# Patient Record
Sex: MALE | Race: WHITE | NOT HISPANIC OR LATINO | Employment: OTHER | URBAN - METROPOLITAN AREA
[De-identification: names, ages, dates, MRNs, and addresses within clinical notes are randomized per-mention and may not be internally consistent; named-entity substitution may affect disease eponyms.]

---

## 2020-09-17 DIAGNOSIS — Z12.11 SCREENING FOR COLON CANCER: Primary | ICD-10-CM

## 2020-09-20 ENCOUNTER — APPOINTMENT (OUTPATIENT)
Dept: PREADMISSION TESTING | Facility: HOSPITAL | Age: 80
End: 2020-09-20

## 2020-09-20 PROCEDURE — U0004 COV-19 TEST NON-CDC HGH THRU: HCPCS

## 2020-09-20 PROCEDURE — C9803 HOPD COVID-19 SPEC COLLECT: HCPCS

## 2020-09-21 LAB — SARS-COV-2 RNA NOSE QL NAA+PROBE: NOT DETECTED

## 2020-09-23 ENCOUNTER — OUTSIDE FACILITY SERVICE (OUTPATIENT)
Dept: GASTROENTEROLOGY | Facility: CLINIC | Age: 80
End: 2020-09-23

## 2020-09-23 PROCEDURE — G0105 COLORECTAL SCRN; HI RISK IND: HCPCS | Performed by: INTERNAL MEDICINE

## 2022-10-23 ENCOUNTER — APPOINTMENT (OUTPATIENT)
Dept: GENERAL RADIOLOGY | Facility: HOSPITAL | Age: 82
End: 2022-10-23

## 2022-10-23 ENCOUNTER — HOSPITAL ENCOUNTER (EMERGENCY)
Facility: HOSPITAL | Age: 82
Discharge: HOME | End: 2022-10-23
Attending: EMERGENCY MEDICINE | Admitting: EMERGENCY MEDICINE

## 2022-10-23 VITALS
OXYGEN SATURATION: 94 % | HEIGHT: 69 IN | TEMPERATURE: 98.1 F | DIASTOLIC BLOOD PRESSURE: 83 MMHG | SYSTOLIC BLOOD PRESSURE: 151 MMHG | WEIGHT: 185 LBS | RESPIRATION RATE: 18 BRPM | BODY MASS INDEX: 27.4 KG/M2 | HEART RATE: 101 BPM

## 2022-10-23 DIAGNOSIS — R06.00 DYSPNEA, UNSPECIFIED TYPE: ICD-10-CM

## 2022-10-23 DIAGNOSIS — J81.0 ACUTE PULMONARY EDEMA: Primary | ICD-10-CM

## 2022-10-23 DIAGNOSIS — J39.8 CONGESTION OF UPPER RESPIRATORY TRACT: ICD-10-CM

## 2022-10-23 LAB
ALBUMIN SERPL-MCNC: 3.6 G/DL (ref 3.5–5.2)
ALBUMIN/GLOB SERPL: 1.2 G/DL
ALP SERPL-CCNC: 66 U/L (ref 39–117)
ALT SERPL W P-5'-P-CCNC: 14 U/L (ref 1–41)
ANION GAP SERPL CALCULATED.3IONS-SCNC: 8 MMOL/L (ref 5–15)
AST SERPL-CCNC: 18 U/L (ref 1–40)
BASOPHILS # BLD AUTO: 0.03 10*3/MM3 (ref 0–0.2)
BASOPHILS NFR BLD AUTO: 0.3 % (ref 0–1.5)
BILIRUB SERPL-MCNC: 0.6 MG/DL (ref 0–1.2)
BUN SERPL-MCNC: 13 MG/DL (ref 8–23)
BUN/CREAT SERPL: 13 (ref 7–25)
CALCIUM SPEC-SCNC: 9.2 MG/DL (ref 8.6–10.5)
CHLORIDE SERPL-SCNC: 102 MMOL/L (ref 98–107)
CO2 SERPL-SCNC: 27 MMOL/L (ref 22–29)
CREAT SERPL-MCNC: 1 MG/DL (ref 0.76–1.27)
DEPRECATED RDW RBC AUTO: 42.5 FL (ref 37–54)
EGFRCR SERPLBLD CKD-EPI 2021: 75.1 ML/MIN/1.73
EOSINOPHIL # BLD AUTO: 0.03 10*3/MM3 (ref 0–0.4)
EOSINOPHIL NFR BLD AUTO: 0.3 % (ref 0.3–6.2)
ERYTHROCYTE [DISTWIDTH] IN BLOOD BY AUTOMATED COUNT: 12.5 % (ref 12.3–15.4)
FLUAV SUBTYP SPEC NAA+PROBE: NOT DETECTED
FLUBV RNA ISLT QL NAA+PROBE: NOT DETECTED
GLOBULIN UR ELPH-MCNC: 3 GM/DL
GLUCOSE SERPL-MCNC: 121 MG/DL (ref 65–99)
HCT VFR BLD AUTO: 42.7 % (ref 37.5–51)
HGB BLD-MCNC: 14.4 G/DL (ref 13–17.7)
IMM GRANULOCYTES # BLD AUTO: 0.02 10*3/MM3 (ref 0–0.05)
IMM GRANULOCYTES NFR BLD AUTO: 0.2 % (ref 0–0.5)
LYMPHOCYTES # BLD AUTO: 0.71 10*3/MM3 (ref 0.7–3.1)
LYMPHOCYTES NFR BLD AUTO: 7.2 % (ref 19.6–45.3)
MCH RBC QN AUTO: 31.2 PG (ref 26.6–33)
MCHC RBC AUTO-ENTMCNC: 33.7 G/DL (ref 31.5–35.7)
MCV RBC AUTO: 92.4 FL (ref 79–97)
MONOCYTES # BLD AUTO: 0.94 10*3/MM3 (ref 0.1–0.9)
MONOCYTES NFR BLD AUTO: 9.6 % (ref 5–12)
NEUTROPHILS NFR BLD AUTO: 8.11 10*3/MM3 (ref 1.7–7)
NEUTROPHILS NFR BLD AUTO: 82.4 % (ref 42.7–76)
NRBC BLD AUTO-RTO: 0 /100 WBC (ref 0–0.2)
NT-PROBNP SERPL-MCNC: 272 PG/ML (ref 0–1800)
PLATELET # BLD AUTO: 148 10*3/MM3 (ref 140–450)
PMV BLD AUTO: 9.8 FL (ref 6–12)
POTASSIUM SERPL-SCNC: 4.2 MMOL/L (ref 3.5–5.2)
PROT SERPL-MCNC: 6.6 G/DL (ref 6–8.5)
RBC # BLD AUTO: 4.62 10*6/MM3 (ref 4.14–5.8)
SARS-COV-2 RNA PNL SPEC NAA+PROBE: NOT DETECTED
SODIUM SERPL-SCNC: 137 MMOL/L (ref 136–145)
TROPONIN T SERPL-MCNC: <0.01 NG/ML (ref 0–0.03)
WBC NRBC COR # BLD: 9.84 10*3/MM3 (ref 3.4–10.8)

## 2022-10-23 PROCEDURE — 85025 COMPLETE CBC W/AUTO DIFF WBC: CPT | Performed by: EMERGENCY MEDICINE

## 2022-10-23 PROCEDURE — 93005 ELECTROCARDIOGRAM TRACING: CPT | Performed by: EMERGENCY MEDICINE

## 2022-10-23 PROCEDURE — 83880 ASSAY OF NATRIURETIC PEPTIDE: CPT | Performed by: EMERGENCY MEDICINE

## 2022-10-23 PROCEDURE — 71045 X-RAY EXAM CHEST 1 VIEW: CPT

## 2022-10-23 PROCEDURE — 87636 SARSCOV2 & INF A&B AMP PRB: CPT | Performed by: EMERGENCY MEDICINE

## 2022-10-23 PROCEDURE — 84484 ASSAY OF TROPONIN QUANT: CPT | Performed by: EMERGENCY MEDICINE

## 2022-10-23 PROCEDURE — 99284 EMERGENCY DEPT VISIT MOD MDM: CPT

## 2022-10-23 PROCEDURE — 80053 COMPREHEN METABOLIC PANEL: CPT | Performed by: EMERGENCY MEDICINE

## 2022-10-23 PROCEDURE — 99283 EMERGENCY DEPT VISIT LOW MDM: CPT

## 2022-10-23 PROCEDURE — 36415 COLL VENOUS BLD VENIPUNCTURE: CPT

## 2022-10-23 RX ORDER — FUROSEMIDE 20 MG/1
20 TABLET ORAL ONCE
Status: COMPLETED | OUTPATIENT
Start: 2022-10-23 | End: 2022-10-23

## 2022-10-23 RX ORDER — FUROSEMIDE 20 MG/1
20 TABLET ORAL DAILY
Qty: 5 TABLET | Refills: 0 | Status: SHIPPED | OUTPATIENT
Start: 2022-10-23 | End: 2022-10-25 | Stop reason: SDUPTHER

## 2022-10-23 RX ADMIN — FUROSEMIDE 20 MG: 20 TABLET ORAL at 14:36

## 2022-10-23 NOTE — ED PROVIDER NOTES
Subjective   History of Present Illness  82-year-old male who presents for evaluation of cough, upper respiratory congestion, and mild shortness of breath.  He reports that he began developing some cough within the last 3 days.  He has had upper respiratory congestion and postnasal drainage.  He feels like he has mild chest pain secondary to cough but otherwise denies any chest pain.  He also reports feeling very mildly short of breath.  He denies underlying history of lung disease and does not typically wear oxygen.  He is standing and ambulated into the ER on his own and appears well nontoxic and in no acute distress.  He also complains of some increased urinary frequency and mild dysuria over the last couple days.  He denies previous known history of kidney stones or urinary tract infections.  No reported abdominal pain or flank pain.  He denies take medication prior to arrival.  He denies any known sick contacts.  He has not received testing for COVID or influenza prior to arrival.  No other acute complaints.        Review of Systems   Constitutional: Positive for fatigue. Negative for chills and fever.   HENT: Positive for congestion, postnasal drip and rhinorrhea. Negative for ear pain, sinus pressure and sore throat.    Eyes: Negative for pain, redness and visual disturbance.   Respiratory: Positive for cough and shortness of breath. Negative for chest tightness.    Cardiovascular: Positive for chest pain. Negative for palpitations and leg swelling.   Gastrointestinal: Negative for abdominal pain, anal bleeding, blood in stool, diarrhea, nausea and vomiting.   Endocrine: Negative for polydipsia and polyuria.   Genitourinary: Negative for difficulty urinating, dysuria, frequency and urgency.   Musculoskeletal: Negative for arthralgias, back pain and neck pain.   Skin: Negative for pallor and rash.   Allergic/Immunologic: Negative for environmental allergies and immunocompromised state.   Neurological: Negative  for dizziness, weakness and headaches.   Hematological: Negative for adenopathy.   Psychiatric/Behavioral: Negative for confusion, self-injury and suicidal ideas. The patient is not nervous/anxious.    All other systems reviewed and are negative.      Past Medical History:   Diagnosis Date   • Allergies    • Benign prostate hyperplasia    • Heart burn        No Known Allergies    History reviewed. No pertinent surgical history.    History reviewed. No pertinent family history.    Social History     Socioeconomic History   • Marital status:            Objective   Physical Exam  Vitals and nursing note reviewed.   Constitutional:       General: He is not in acute distress.     Appearance: Normal appearance. He is well-developed. He is not toxic-appearing or diaphoretic.      Comments: Warm to touch   HENT:      Head: Normocephalic and atraumatic.      Right Ear: External ear normal.      Left Ear: External ear normal.      Nose: Nose normal.   Eyes:      General: Lids are normal.      Pupils: Pupils are equal, round, and reactive to light.   Neck:      Trachea: No tracheal deviation.   Cardiovascular:      Rate and Rhythm: Regular rhythm. Tachycardia present.      Pulses: No decreased pulses.      Heart sounds: Normal heart sounds. No murmur heard.    No friction rub. No gallop.   Pulmonary:      Effort: Pulmonary effort is normal. No respiratory distress.      Breath sounds: Normal breath sounds. No decreased breath sounds, wheezing, rhonchi or rales.   Abdominal:      General: Bowel sounds are normal.      Palpations: Abdomen is soft.      Tenderness: There is no abdominal tenderness. There is no guarding or rebound.   Musculoskeletal:         General: No deformity. Normal range of motion.      Cervical back: Normal range of motion and neck supple.   Lymphadenopathy:      Cervical: No cervical adenopathy.   Skin:     General: Skin is warm and dry.      Findings: No rash.   Neurological:      Mental Status: He  is alert and oriented to person, place, and time.      Cranial Nerves: No cranial nerve deficit.      Sensory: No sensory deficit.   Psychiatric:         Speech: Speech normal.         Behavior: Behavior normal.         Thought Content: Thought content normal.         Judgment: Judgment normal.         Procedures           ED Course                                           MDM  Number of Diagnoses or Management Options  Acute pulmonary edema (HCC): new and requires workup  Congestion of upper respiratory tract: new and requires workup  Dyspnea, unspecified type: new and requires workup  Diagnosis management comments: The patient has signs of pulmonary edema on chest x-ray.  Lungs are relatively clear to auscultation oxygen saturations have remained within normal range throughout the ER course.    COVID and influenza test are negative.  Lab evaluation shows normal BMP with no other significant abnormalities including normal troponin.  EKG does not show any ischemic changes.    He denies any chest pain on repeat evaluation.    He will be discharged with a short course of Lasix and will be referred to the heart valve clinic for further outpatient evaluation.    Discharged appearing well with the advised to return to the ER with any further concern.       Amount and/or Complexity of Data Reviewed  Clinical lab tests: ordered and reviewed  Tests in the radiology section of CPT®: ordered and reviewed  Obtain history from someone other than the patient: yes  Review and summarize past medical records: yes  Independent visualization of images, tracings, or specimens: yes        Final diagnoses:   Acute pulmonary edema (HCC)   Dyspnea, unspecified type   Congestion of upper respiratory tract       ED Disposition  ED Disposition     ED Disposition   Discharge    Condition   Stable    Comment   --             VONDA BURLESON [place]  [address]  [facility], located within [facility] [id number] [phone]        [place]  Demetrius MARTINS MD  [address] [facility] [address]  [phone]    In 1 week           Medication List      New Prescriptions    furosemide 20 MG tablet  Commonly known as: LASIX  Take 1 tablet by mouth Daily for 5 days.           Where to Get Your Medications      These medications were sent to [facility] - [place], [place] - [address] [place] [address] - [phone]  - [phone]   [address] [facility] [address]    Phone: [phone]   · furosemide 20 MG tablet          Nae Castro MD  10/23/22 2356     No

## 2022-10-23 NOTE — DISCHARGE INSTRUCTIONS
Patient advised to take Lasix as prescribed on a daily basis.    Follow-up in heart valve clinic for outpatient evaluation.    Return to the ER with any further concern.

## 2022-10-25 ENCOUNTER — OFFICE VISIT (OUTPATIENT)
Dept: CARDIOLOGY | Facility: HOSPITAL | Age: 82
End: 2022-10-25

## 2022-10-25 VITALS
RESPIRATION RATE: 16 BRPM | HEIGHT: 69 IN | OXYGEN SATURATION: 93 % | BODY MASS INDEX: 28.44 KG/M2 | SYSTOLIC BLOOD PRESSURE: 122 MMHG | TEMPERATURE: 99.2 F | DIASTOLIC BLOOD PRESSURE: 71 MMHG | WEIGHT: 192 LBS | HEART RATE: 103 BPM

## 2022-10-25 DIAGNOSIS — R60.0 BILATERAL LOWER EXTREMITY EDEMA: ICD-10-CM

## 2022-10-25 DIAGNOSIS — J81.0 ACUTE PULMONARY EDEMA: Primary | ICD-10-CM

## 2022-10-25 DIAGNOSIS — R93.89 NODULAR RADIOLOGIC DENSITY: ICD-10-CM

## 2022-10-25 LAB
QT INTERVAL: 340 MS
QTC INTERVAL: 425 MS

## 2022-10-25 PROCEDURE — 99204 OFFICE O/P NEW MOD 45 MIN: CPT | Performed by: NURSE PRACTITIONER

## 2022-10-25 RX ORDER — POTASSIUM CHLORIDE 20 MEQ/1
20 TABLET, EXTENDED RELEASE ORAL DAILY
Qty: 30 TABLET | Refills: 0 | Status: SHIPPED | OUTPATIENT
Start: 2022-10-25 | End: 2022-11-10 | Stop reason: SDUPTHER

## 2022-10-25 RX ORDER — OMEPRAZOLE 20 MG/1
20 CAPSULE, DELAYED RELEASE ORAL DAILY
COMMUNITY

## 2022-10-25 RX ORDER — FINASTERIDE 5 MG/1
TABLET, FILM COATED ORAL
COMMUNITY
Start: 2022-08-29

## 2022-10-25 RX ORDER — FUROSEMIDE 40 MG/1
40 TABLET ORAL DAILY
Qty: 30 TABLET | Refills: 0 | Status: SHIPPED | OUTPATIENT
Start: 2022-10-25 | End: 2022-11-10 | Stop reason: SDUPTHER

## 2022-10-25 RX ORDER — MONTELUKAST SODIUM 10 MG/1
10 TABLET ORAL NIGHTLY
COMMUNITY
Start: 2022-10-18 | End: 2022-11-18 | Stop reason: SDDI

## 2022-10-25 NOTE — PATIENT INSTRUCTIONS
-pulmonary will call to schedule an appointment    - Office will call to schedule testing  - Office will call or send message with testing results    [email or web address]

## 2022-10-25 NOTE — PROGRESS NOTES
Chief Complaint  Shortness of Breath, Cough, and Edema    Subjective      History of Present Illness {CC  Problem List  Visit  Diagnosis   Encounters  Notes  Medications  Labs  Result Review Imaging  Media :23}     Raul Soto, 82 y.o. male with BPH, heartburn presents to [facility] for Shortness of Breath, Cough, and Edema.    Patient was recently evaluated at [facility] emergency department for complaints of cough, upper respiratory congestion, and mild shortness of breath.  Emergency department work-up revealed normal troponin/BNP, CXR with cardiomegaly with pulmonary vascular congestion, ECG with NSR/no acute abnormality, respiratory panel negative for influenza/COVID-19. Patient was discharged with a short course of furosemide.  Patient was instructed to follow-up at [facility] heart and valve clinic for further evaluation.    Patient presents today noting continued congestion, cough, lower extremity edema since emergency department evaluation.  He reports not much improvement in his URI symptoms since emergency department evaluation. He has been taking furosemide with no noted increased diuretic response.  He notes dyspnea with longer walks; difficulty lying flat but more related to post-nasal drip/congestion.  Previously before his recent illness he was doing well with no chest pain, dyspnea with activity. He reports lower extremity edema has been ongoing for 3-4 years, but edema has worsened in the past couple of weeks.  He denies chest/anginal pain, palpitations/tachypalpitation, near-syncope/syncope. He reports a previous cardiac work-up including stress test and echocardiogram approximately 15 years ago that were reported as normal.  Reports no previous history of cardiac disease.  Does not routinely monitor his weight at home.      Objective     Vital Signs:   Vitals:    10/25/22 1125 10/25/22 1132 10/25/22 1133   BP: 121/65 125/71 122/71   BP  "Location: Right arm Left arm Left arm   Patient Position: Sitting Sitting Standing   Cuff Size: Adult Adult Adult   Pulse: 102 100 103   Resp: 16     Temp: 99.2 °F (37.3 °C) 99.2 °F (37.3 °C)    TempSrc: Temporal Temporal    SpO2: 93%     Weight: 87.1 kg (192 lb)     Height: 175.3 cm (69\")       Body mass index is 28.35 kg/m².  Physical Exam  Vitals and nursing note reviewed.   Constitutional:       Appearance: Normal appearance.   HENT:      Head: Normocephalic.   Eyes:      Extraocular Movements: Extraocular movements intact.   Neck:      Vascular: No carotid bruit.   Cardiovascular:      Rate and Rhythm: Normal rate and regular rhythm.      Pulses: Normal pulses.      Heart sounds: Normal heart sounds, S1 normal and S2 normal. No murmur heard.  Pulmonary:      Effort: Pulmonary effort is normal. No respiratory distress.      Breath sounds: Normal breath sounds.      Comments: Intermittent cough present  Musculoskeletal:      Cervical back: Neck supple.      Right lower leg: Edema present.      Left lower leg: Edema present.      Comments: 2+ bilateral pitting edema   Skin:     General: Skin is warm and dry.   Neurological:      General: No focal deficit present.      Mental Status: He is alert.   Psychiatric:         Mood and Affect: Mood normal.         Behavior: Behavior normal.         Thought Content: Thought content normal.       Data Reviewed:{ Labs  Result Review  Imaging  Med Tab  Media :23}     CBC & Differential (10/23/2022 11:27)  Comprehensive Metabolic Panel (10/23/2022 11:27)  Troponin (10/23/2022 11:27)  BNP (10/23/2022 11:27)  XR Chest 1 View (10/23/2022 11:33)  ECG 12 Lead (10/23/2022 11:43)      Assessment & Plan   Assessment and Plan {CC Problem List  Visit Diagnosis  ROS  Review (Popup)  Health Maintenance  Quality  BestPractice  Medications  SmartSets  SnapShot Encounters  Media :23}     1. Acute pulmonary edema (HCC)  -Recent emergency department evaluation with URI symptoms; " CXR revealed cardiomegaly with pulmonary vascular congestion.  Initiated on furosemide 20 Mg daily and emergency department evaluation.  No previous cardiac diagnoses.  -BNP normal at 272 at ED evaluation  -Increase furosemide to 40 Mg daily with addition of potassium 20meq daily (no noted diuretic increase with furosemide 20 Mg initiated at emergency department)  -Urgent echocardiogram to evaluate for cardiac function.  -Daily weight monitoring; discussed when to contact heart valve clinic for weight gain  -Follow-up in approximately 2 weeks for symptom check  -Discussed follow-up with PCP for potential URI symptoms, discussed OTC medications.     2. Bilateral lower extremity edema  -Reports lower extremity edema ongoing for 3-4 years, but edema has worsened in the past couple of weeks. Given his pulmonary edema with worsened LE edema will complete urgent echocardiogram for cardiac functional assessment.  -Increase furosemide to 40 Mg daily with addition of potassium 20meq daily as above  -Adult Transthoracic Echo Complete W/ Cont if Necessary Per Protocol; Future  -Discussed use of compression socks, leg elevation if possible when in a seated position    3. Nodular radiologic density  -CXR during emergency department evaluation revealed 6 to 7 mm nodular density in the right base. Reported may be due to calcified granuloma, but cannot be definitively characterized without prior studies or CT.  - Ambulatory Referral to Pulmonology for further evaluation/recommendations      Follow Up {Instructions Charge Capture  Follow-up Communications :23}     Return in about 2 weeks (around 11/8/2022) for Office follow-up, Recheck, lab work.      Patient was given instructions and counseling regarding his condition or for health maintenance advice. Please see specific information pulled into the AVS if appropriate.  Patient was instructed to call the [facility] Center with any questions, concerns, or worsening  symptoms.    Dictated Utilizing Dragon Dictation   Please note that portions of this note were completed with a voice recognition program.   Part of this note may be an electronic transcription/translation of spoken language to printed text using the Dragon Dictation System.

## 2022-10-27 ENCOUNTER — OFFICE VISIT (OUTPATIENT)
Dept: PULMONOLOGY | Facility: CLINIC | Age: 82
End: 2022-10-27

## 2022-10-27 VITALS
OXYGEN SATURATION: 95 % | SYSTOLIC BLOOD PRESSURE: 124 MMHG | WEIGHT: 191 LBS | TEMPERATURE: 97.6 F | HEIGHT: 69 IN | DIASTOLIC BLOOD PRESSURE: 70 MMHG | HEART RATE: 98 BPM | BODY MASS INDEX: 28.29 KG/M2

## 2022-10-27 DIAGNOSIS — R91.1 NODULE OF LOWER LOBE OF RIGHT LUNG: ICD-10-CM

## 2022-10-27 DIAGNOSIS — R05.2 SUBACUTE COUGH: Primary | ICD-10-CM

## 2022-10-27 DIAGNOSIS — J06.9 VIRAL URI: ICD-10-CM

## 2022-10-27 DIAGNOSIS — M79.661 BILATERAL CALF PAIN: ICD-10-CM

## 2022-10-27 DIAGNOSIS — R60.0 BILATERAL LOWER EXTREMITY EDEMA: ICD-10-CM

## 2022-10-27 DIAGNOSIS — M79.662 BILATERAL CALF PAIN: ICD-10-CM

## 2022-10-27 PROCEDURE — 94729 DIFFUSING CAPACITY: CPT | Performed by: INTERNAL MEDICINE

## 2022-10-27 PROCEDURE — 94726 PLETHYSMOGRAPHY LUNG VOLUMES: CPT | Performed by: INTERNAL MEDICINE

## 2022-10-27 PROCEDURE — 94375 RESPIRATORY FLOW VOLUME LOOP: CPT | Performed by: INTERNAL MEDICINE

## 2022-10-27 PROCEDURE — 99204 OFFICE O/P NEW MOD 45 MIN: CPT | Performed by: INTERNAL MEDICINE

## 2022-10-27 NOTE — PROGRESS NOTES
Initial Pulmonary Consult Note    Subjective   Reason for consultation: abnormal CXR    Raul Soto is a 82 y.o. male is being seen for consultation today at the request of ISMAEL Pinto    History of Present Illness     82 y.o. remote smoker smoker with 7.5 PYH, with history GERD, perennial allergies, BPH, here for evaluation of cough and abnormal CXR.  Felt fine playing golf on Saturday but developed cough, congestion, URI symptoms Saturday night and was seen in ED .    Patient has had some low grade fevers 100.8 on Monday.  No sick contacts.     Patient has had leg swelling over the past week.  He has had some ankle swelling over the past 5 years or so.      He takes a non-sedating antihistamine daily.      The following portions of the patient's history were reviewed and updated as appropriate: allergies, current medications, past family history, past medical history, past social history, past surgical history and problem list.    Active Ambulatory Problems     Diagnosis Date Noted   • Bilateral lower extremity edema 10/27/2022   • Bilateral calf pain 10/27/2022   • Viral URI 10/27/2022   • Nodule of lower lobe of right lung 10/27/2022   • Subacute cough 10/27/2022     Resolved Ambulatory Problems     Diagnosis Date Noted   • No Resolved Ambulatory Problems     Past Medical History:   Diagnosis Date   • Allergies    • Benign prostate hyperplasia    • Heart burn        History reviewed. No pertinent surgical history.    History reviewed. No pertinent family history.    Social History     Socioeconomic History   • Marital status:    Tobacco Use   • Smoking status: Former     Packs/day: 1.50     Years: 5.00     Pack years: 7.50     Types: Cigarettes     Quit date: 1970     Years since quittin.8   • Smokeless tobacco: Never   Substance and Sexual Activity   • Alcohol use: Not Currently   • Drug use: Never   • Sexual activity: Defer       No Known Allergies    Current Outpatient  "Medications   Medication Sig Dispense Refill   • finasteride (PROSCAR) 5 MG tablet      • furosemide (LASIX) 40 MG tablet Take 1 tablet by mouth Daily for 30 doses. 30 tablet 0   • montelukast (SINGULAIR) 10 MG tablet Take 1 tablet by mouth Every Night.     • omeprazole (priLOSEC) 20 MG capsule Take 1 capsule by mouth Daily.     • potassium chloride (K-DUR,KLOR-CON) 20 MEQ CR tablet Take 1 tablet by mouth Daily. 30 tablet 0     No current facility-administered medications for this visit.       Review of Systems  All other systems were reviewed and are negative.  Exceptions are included in the HPI or as otherwise noted above.     Objective   Blood pressure 124/70, pulse 98, temperature 97.6 °F (36.4 °C), height 175.3 cm (69\"), weight 86.6 kg (191 lb), SpO2 95 %.  Physical Exam    Physical Exam:     Constitutional:    Alert, cooperative, in no acute distress   Head:    Normocephalic, without obvious abnormality, atraumatic   Eyes:            Lids and lashes normal, conjunctivae and sclerae normal, no   icterus, no pallor, corneas clear, PER   ENMT:   Ears appear intact with no abnormalities noted      No oral lesions, no thrush, oral mucosa moist, near complete obstruction of nares bilaterally secondary to nasal mucosal edema.   Neck:   No adenopathy, supple, trachea midline, no thyromegaly, no JVD       Lungs/Resp:     Normal effort, symmetric chest rise, no crepitus, clear to      auscultation bilaterally, no chest wall tenderness                 Heart/CV:    Regular rhythm and normal rate, normal S1 and S2, no            murmur   Abdomen/GI:     Soft, non-tender, non-distended, normal bowel sounds   :     Deferred   Extremities/MSK:   No clubbing or cyanosis.  3+ bilateral lower extremity edema.  Normal tone.  No deformities.    Pulses:      Skin:   No bleeding, bruising or rash   Heme/Lymph:   No cervical or supraclavicular adenopathy.    Neurologic:    Psychiatric:       Moves all extremities with no obvious " focal motor deficit.  Cranial nerves 2 - 12 grossly intact   Oriented, normal affect.          The above findings are documentation of my personal physical examination from today.  Electronically signed by:  Darnell Campos MD  10/27/22  11:09 EDT      PFTs:  Full pulmonary function testing was done on 10/27/2022.  Please see scanned PFT report for complete details.  Study was a poor study that was not reproducible.  FVC was 2.13 L or 56% predicted.  FEV1 was 1.73 L or 65% predicted.  FEV1 to FVC ratio was 81%.  Maximal voluntary ventilation was 57 L/min or 54% predicted.  Total lung capacity 4.44 L or 73% predicted.  Residual volume 2.11 L or 79% predicted.  DLCO 110% predicted.    Interpretation: Poor study that is not reproducible.  Based on results obtained, there appears to be no obstruction.  Possibly mild restriction, low maximal voluntary ventilation.  No air trapping or hyperinflation.  Normal DLCO.  No prior study available for immediate comparison.    Imaging:  I reviewed the patient's chest x-ray from October 23, 2022.  Radiologist noted a right lower lobe possible calcified nodule.  I do not see any definitive evidence of this on my review.  He noted some congestion.  It looks like it was a low volume study.    Chest x-ray PA and lateral    Study date: October 27, 2022    Indication: Persistent cough    Comparison: Prior chest x-ray from October 23, 2022    Interpretation: Trachea is midline.  Main rickey is sharp.  There is no cardiomegaly.  There is a small left pleural effusion.  No definite infiltrate or mass.  No definite nodule seen.    Impression: Small left pleural effusion.  Otherwise, no acute radiographic chest abnormality.    Assessment & Plan   Problems Addressed this Visit        ENT    Viral URI       Musculoskeletal and Injuries    Bilateral calf pain    Relevant Orders    Duplex Venous Lower Extremity - Bilateral CAR       Pulmonary and Pneumonias    Nodule of lower lobe of right  lung    Relevant Orders    CT Chest Without Contrast    Subacute cough - Primary    Relevant Orders    XR Chest PA & Lateral    Pulmonary Function Test (Completed)       Symptoms and Signs    Bilateral lower extremity edema    Relevant Orders    Duplex Venous Lower Extremity - Bilateral CAR   Diagnoses       Codes Comments    Subacute cough    -  Primary ICD-10-CM: R05.2  ICD-9-CM: 786.2     Bilateral lower extremity edema     ICD-10-CM: R60.0  ICD-9-CM: 782.3     Bilateral calf pain     ICD-10-CM: M79.661, M79.662  ICD-9-CM: 729.5     Nodule of lower lobe of right lung     ICD-10-CM: R91.1  ICD-9-CM: 793.11     Viral URI     ICD-10-CM: J06.9  ICD-9-CM: 465.9           Discussion:  82 y.o. male who developed URI symptoms over the weekend and was seen in the ER on Sunday.  Chest x-ray showed a possible right lower lobe nodule.  He has had persistent symptoms including worsening lower extremity edema, despite starting diuretics recently.  He was seen by cardiology in the office with an echocardiogram pending.    Patient has persistent symptoms of upper respiratory infection.  This is most likely viral in nature.  No definite evidence of infiltrate on chest x-ray, although a CT scan would be more sensitive.  There is a lung nodule noted by the radiologist and I will obtain a CT scan for further evaluation of this.  Given his significant lower extremity edema, I will check a lower extremity duplex bilaterally to evaluate for DVT.  I feel this is fairly high likelihood.    Otherwise, I would recommend symptomatic treatment for viral upper respiratory infection.  I recommended a trial of pseudoephedrine given his significant nasal congestion.  Otherwise acetaminophen for aches/pains, over-the-counter cold medication, and nasal saline irrigation.    This pulmonary function testing appeared abnormal, but it was a poor study given his ongoing symptoms with cough and we were able to get good values.  If there is a significant  abnormality on his CT chest that would reflect as restrictive disease on pulmonary function testing, I will see him back in the office.  Otherwise, we can follow-up on an as-needed basis.  If his duplex shows DVT, I will put him on anticoagulation, likely with Eliquis, and follow him up for that.  Otherwise she should return to care if his symptoms persist or worsen.         I personally spent a total of 45 minutes on patient visit today including chart review, face to face with the patient obtaining the history and physical exam, review of pertinent images and tests, counseling and discussion and/or coordination of care as described above, and documentation.  Total time excludes time spent on other separate services such as performing procedures or test interpretation, if applicable.      Electronically signed by:  Darnell Campos MD  10/27/22  11:09 EDT    • Please note that portions of this note were completed with [employer] - a voice recognition program.

## 2022-11-03 ENCOUNTER — HOSPITAL ENCOUNTER (OUTPATIENT)
Dept: CARDIOLOGY | Facility: HOSPITAL | Age: 82
Discharge: HOME | End: 2022-11-03
Admitting: NURSE PRACTITIONER

## 2022-11-03 VITALS — HEIGHT: 69 IN | BODY MASS INDEX: 28.28 KG/M2 | WEIGHT: 190.92 LBS

## 2022-11-03 DIAGNOSIS — J81.0 ACUTE PULMONARY EDEMA: ICD-10-CM

## 2022-11-03 DIAGNOSIS — R60.0 BILATERAL LOWER EXTREMITY EDEMA: ICD-10-CM

## 2022-11-03 LAB
ASCENDING AORTA: 2.7 CM
BH CV ECHO MEAS - AO MAX PG: 3.7 MMHG
BH CV ECHO MEAS - AO MEAN PG: 2 MMHG
BH CV ECHO MEAS - AO ROOT DIAM: 2.8 CM
BH CV ECHO MEAS - AO V2 MAX: 95.6 CM/SEC
BH CV ECHO MEAS - AO V2 VTI: 19.2 CM
BH CV ECHO MEAS - AVA(I,D): 2.8 CM2
BH CV ECHO MEAS - EDV(CUBED): 43.6 ML
BH CV ECHO MEAS - EDV(MOD-SP2): 63 ML
BH CV ECHO MEAS - EDV(MOD-SP4): 71 ML
BH CV ECHO MEAS - EF(MOD-BP): 66 %
BH CV ECHO MEAS - EF(MOD-SP2): 63.5 %
BH CV ECHO MEAS - EF(MOD-SP4): 67.6 %
BH CV ECHO MEAS - ESV(CUBED): 10.8 ML
BH CV ECHO MEAS - ESV(MOD-SP2): 23 ML
BH CV ECHO MEAS - ESV(MOD-SP4): 23 ML
BH CV ECHO MEAS - FS: 37.2 %
BH CV ECHO MEAS - IVS/LVPW: 1.09 CM
BH CV ECHO MEAS - IVSD: 1.34 CM
BH CV ECHO MEAS - LA DIMENSION: 3.4 CM
BH CV ECHO MEAS - LAT PEAK E' VEL: 8.1 CM/SEC
BH CV ECHO MEAS - LV DIASTOLIC VOL/BSA (35-75): 35.1 CM2
BH CV ECHO MEAS - LV MASS(C)D: 152.2 GRAMS
BH CV ECHO MEAS - LV MAX PG: 3 MMHG
BH CV ECHO MEAS - LV MEAN PG: 2 MMHG
BH CV ECHO MEAS - LV SYSTOLIC VOL/BSA (12-30): 11.4 CM2
BH CV ECHO MEAS - LV V1 MAX: 86.5 CM/SEC
BH CV ECHO MEAS - LV V1 VTI: 17.2 CM
BH CV ECHO MEAS - LVIDD: 3.5 CM
BH CV ECHO MEAS - LVIDS: 2.21 CM
BH CV ECHO MEAS - LVOT AREA: 3.1 CM2
BH CV ECHO MEAS - LVOT DIAM: 2 CM
BH CV ECHO MEAS - LVPWD: 1.23 CM
BH CV ECHO MEAS - MED PEAK E' VEL: 6.91 CM/SEC
BH CV ECHO MEAS - MV A MAX VEL: 75 CM/SEC
BH CV ECHO MEAS - MV DEC SLOPE: 326 CM/SEC2
BH CV ECHO MEAS - MV DEC TIME: 0.2 MSEC
BH CV ECHO MEAS - MV E MAX VEL: 59.2 CM/SEC
BH CV ECHO MEAS - MV E/A: 0.79
BH CV ECHO MEAS - MV P1/2T: 71.8 MSEC
BH CV ECHO MEAS - MVA(P1/2T): 3.1 CM2
BH CV ECHO MEAS - PA ACC SLOPE: 315 CM/SEC2
BH CV ECHO MEAS - PA ACC TIME: 0.16 SEC
BH CV ECHO MEAS - PA PR(ACCEL): 9.3 MMHG
BH CV ECHO MEAS - PI END-D VEL: 89.6 CM/SEC
BH CV ECHO MEAS - RAP SYSTOLE: 3 MMHG
BH CV ECHO MEAS - RVDD: 2.9 CM
BH CV ECHO MEAS - RVSP: 17 MMHG
BH CV ECHO MEAS - SI(MOD-SP2): 19.7 ML/M2
BH CV ECHO MEAS - SI(MOD-SP4): 23.7 ML/M2
BH CV ECHO MEAS - SV(LVOT): 54 ML
BH CV ECHO MEAS - SV(MOD-SP2): 40 ML
BH CV ECHO MEAS - SV(MOD-SP4): 48 ML
BH CV ECHO MEAS - TAPSE (>1.6): 2.3 CM
BH CV ECHO MEAS - TR MAX PG: 14.3 MMHG
BH CV ECHO MEAS - TR MAX VEL: 189 CM/SEC
BH CV ECHO MEASUREMENTS AVERAGE E/E' RATIO: 7.89
BH CV VAS BP LEFT ARM: NORMAL MMHG
BH CV XLRA - RV BASE: 4 CM
BH CV XLRA - RV LENGTH: 7.7 CM
BH CV XLRA - RV MID: 2.7 CM
BH CV XLRA - TDI S': 13.7 CM/SEC
IVRT: 102 MSEC
LEFT ATRIUM VOLUME INDEX: 21.7 ML/M2
LEFT ATRIUM VOLUME: 44 ML
MAXIMAL PREDICTED HEART RATE: 138 BPM
STRESS TARGET HR: 117 BPM

## 2022-11-03 PROCEDURE — 93306 TTE W/DOPPLER COMPLETE: CPT | Performed by: INTERNAL MEDICINE

## 2022-11-03 PROCEDURE — 93306 TTE W/DOPPLER COMPLETE: CPT

## 2022-11-07 ENCOUNTER — APPOINTMENT (OUTPATIENT)
Dept: CARDIOLOGY | Facility: HOSPITAL | Age: 82
End: 2022-11-07

## 2022-11-09 ENCOUNTER — HOSPITAL ENCOUNTER (OUTPATIENT)
Dept: CT IMAGING | Facility: HOSPITAL | Age: 82
Discharge: HOME | End: 2022-11-09

## 2022-11-09 ENCOUNTER — HOSPITAL ENCOUNTER (OUTPATIENT)
Dept: CARDIOLOGY | Facility: HOSPITAL | Age: 82
Discharge: HOME | End: 2022-11-09

## 2022-11-09 VITALS — HEIGHT: 69 IN | BODY MASS INDEX: 26.66 KG/M2 | WEIGHT: 180 LBS

## 2022-11-09 DIAGNOSIS — R91.1 NODULE OF LOWER LOBE OF RIGHT LUNG: ICD-10-CM

## 2022-11-09 LAB
BH CV LOW VAS LEFT COMMON FEMORAL SPONT: 1
BH CV LOW VAS RIGHT COMMON FEMORAL SPONT: 1
BH CV LOWER VASCULAR LEFT COMMON FEMORAL AUGMENT: NORMAL
BH CV LOWER VASCULAR LEFT COMMON FEMORAL COMPETENT: NORMAL
BH CV LOWER VASCULAR LEFT COMMON FEMORAL COMPRESS: NORMAL
BH CV LOWER VASCULAR LEFT COMMON FEMORAL PHASIC: NORMAL
BH CV LOWER VASCULAR LEFT COMMON FEMORAL SPONT: NORMAL
BH CV LOWER VASCULAR LEFT DISTAL FEMORAL AUGMENT: NORMAL
BH CV LOWER VASCULAR LEFT DISTAL FEMORAL COMPETENT: NORMAL
BH CV LOWER VASCULAR LEFT DISTAL FEMORAL COMPRESS: NORMAL
BH CV LOWER VASCULAR LEFT DISTAL FEMORAL PHASIC: NORMAL
BH CV LOWER VASCULAR LEFT DISTAL FEMORAL SPONT: NORMAL
BH CV LOWER VASCULAR LEFT GASTRONEMIUS COMPRESS: NORMAL
BH CV LOWER VASCULAR LEFT GREATER SAPH AK COMPRESS: NORMAL
BH CV LOWER VASCULAR LEFT GREATER SAPH BK COMPRESS: NORMAL
BH CV LOWER VASCULAR LEFT LESSER SAPH COMPRESS: NORMAL
BH CV LOWER VASCULAR LEFT MID FEMORAL AUGMENT: NORMAL
BH CV LOWER VASCULAR LEFT MID FEMORAL COMPETENT: NORMAL
BH CV LOWER VASCULAR LEFT MID FEMORAL COMPRESS: NORMAL
BH CV LOWER VASCULAR LEFT MID FEMORAL PHASIC: NORMAL
BH CV LOWER VASCULAR LEFT MID FEMORAL SPONT: NORMAL
BH CV LOWER VASCULAR LEFT PERONEAL COMPRESS: NORMAL
BH CV LOWER VASCULAR LEFT POPLITEAL AUGMENT: NORMAL
BH CV LOWER VASCULAR LEFT POPLITEAL COMPETENT: NORMAL
BH CV LOWER VASCULAR LEFT POPLITEAL COMPRESS: NORMAL
BH CV LOWER VASCULAR LEFT POPLITEAL PHASIC: NORMAL
BH CV LOWER VASCULAR LEFT POPLITEAL SPONT: NORMAL
BH CV LOWER VASCULAR LEFT POSTERIOR TIBIAL COMPRESS: NORMAL
BH CV LOWER VASCULAR LEFT PROFUNDA FEMORAL COMPRESS: NORMAL
BH CV LOWER VASCULAR LEFT PROFUNDA FEMORAL PHASIC: NORMAL
BH CV LOWER VASCULAR LEFT PROFUNDA FEMORAL SPONT: NORMAL
BH CV LOWER VASCULAR LEFT PROXIMAL FEMORAL AUGMENT: NORMAL
BH CV LOWER VASCULAR LEFT PROXIMAL FEMORAL COMPETENT: NORMAL
BH CV LOWER VASCULAR LEFT PROXIMAL FEMORAL COMPRESS: NORMAL
BH CV LOWER VASCULAR LEFT PROXIMAL FEMORAL PHASIC: NORMAL
BH CV LOWER VASCULAR LEFT PROXIMAL FEMORAL SPONT: NORMAL
BH CV LOWER VASCULAR LEFT SAPHENOFEMORAL JUNCTION AUGMENT: NORMAL
BH CV LOWER VASCULAR LEFT SAPHENOFEMORAL JUNCTION COMPETENT: NORMAL
BH CV LOWER VASCULAR LEFT SAPHENOFEMORAL JUNCTION COMPRESS: NORMAL
BH CV LOWER VASCULAR LEFT SAPHENOFEMORAL JUNCTION PHASIC: NORMAL
BH CV LOWER VASCULAR LEFT SAPHENOFEMORAL JUNCTION SPONT: NORMAL
BH CV LOWER VASCULAR RIGHT COMMON FEMORAL AUGMENT: NORMAL
BH CV LOWER VASCULAR RIGHT COMMON FEMORAL COMPETENT: NORMAL
BH CV LOWER VASCULAR RIGHT COMMON FEMORAL COMPRESS: NORMAL
BH CV LOWER VASCULAR RIGHT COMMON FEMORAL PHASIC: NORMAL
BH CV LOWER VASCULAR RIGHT COMMON FEMORAL SPONT: NORMAL
BH CV LOWER VASCULAR RIGHT DISTAL FEMORAL AUGMENT: NORMAL
BH CV LOWER VASCULAR RIGHT DISTAL FEMORAL COMPETENT: NORMAL
BH CV LOWER VASCULAR RIGHT DISTAL FEMORAL COMPRESS: NORMAL
BH CV LOWER VASCULAR RIGHT DISTAL FEMORAL PHASIC: NORMAL
BH CV LOWER VASCULAR RIGHT DISTAL FEMORAL SPONT: NORMAL
BH CV LOWER VASCULAR RIGHT GASTRONEMIUS COMPRESS: NORMAL
BH CV LOWER VASCULAR RIGHT GREATER SAPH AK COMPRESS: NORMAL
BH CV LOWER VASCULAR RIGHT GREATER SAPH BK COMPRESS: NORMAL
BH CV LOWER VASCULAR RIGHT LESSER SAPH COMPRESS: NORMAL
BH CV LOWER VASCULAR RIGHT MID FEMORAL AUGMENT: NORMAL
BH CV LOWER VASCULAR RIGHT MID FEMORAL COMPETENT: NORMAL
BH CV LOWER VASCULAR RIGHT MID FEMORAL COMPRESS: NORMAL
BH CV LOWER VASCULAR RIGHT MID FEMORAL PHASIC: NORMAL
BH CV LOWER VASCULAR RIGHT MID FEMORAL SPONT: NORMAL
BH CV LOWER VASCULAR RIGHT PERONEAL COMPRESS: NORMAL
BH CV LOWER VASCULAR RIGHT POPLITEAL AUGMENT: NORMAL
BH CV LOWER VASCULAR RIGHT POPLITEAL COMPETENT: NORMAL
BH CV LOWER VASCULAR RIGHT POPLITEAL COMPRESS: NORMAL
BH CV LOWER VASCULAR RIGHT POPLITEAL PHASIC: NORMAL
BH CV LOWER VASCULAR RIGHT POPLITEAL SPONT: NORMAL
BH CV LOWER VASCULAR RIGHT POSTERIOR TIBIAL COMPRESS: NORMAL
BH CV LOWER VASCULAR RIGHT PROFUNDA FEMORAL COMPRESS: NORMAL
BH CV LOWER VASCULAR RIGHT PROFUNDA FEMORAL PHASIC: NORMAL
BH CV LOWER VASCULAR RIGHT PROFUNDA FEMORAL SPONT: NORMAL
BH CV LOWER VASCULAR RIGHT PROXIMAL FEMORAL AUGMENT: NORMAL
BH CV LOWER VASCULAR RIGHT PROXIMAL FEMORAL COMPETENT: NORMAL
BH CV LOWER VASCULAR RIGHT PROXIMAL FEMORAL COMPRESS: NORMAL
BH CV LOWER VASCULAR RIGHT PROXIMAL FEMORAL PHASIC: NORMAL
BH CV LOWER VASCULAR RIGHT PROXIMAL FEMORAL SPONT: NORMAL
BH CV LOWER VASCULAR RIGHT SAPHENOFEMORAL JUNCTION AUGMENT: NORMAL
BH CV LOWER VASCULAR RIGHT SAPHENOFEMORAL JUNCTION COMPETENT: NORMAL
BH CV LOWER VASCULAR RIGHT SAPHENOFEMORAL JUNCTION COMPRESS: NORMAL
BH CV LOWER VASCULAR RIGHT SAPHENOFEMORAL JUNCTION PHASIC: NORMAL
BH CV LOWER VASCULAR RIGHT SAPHENOFEMORAL JUNCTION SPONT: NORMAL
MAXIMAL PREDICTED HEART RATE: 138 BPM
STRESS TARGET HR: 117 BPM

## 2022-11-09 PROCEDURE — 71250 CT THORAX DX C-: CPT

## 2022-11-09 PROCEDURE — 93970 EXTREMITY STUDY: CPT

## 2022-11-09 PROCEDURE — 93970 EXTREMITY STUDY: CPT | Performed by: INTERNAL MEDICINE

## 2022-11-10 ENCOUNTER — OFFICE VISIT (OUTPATIENT)
Dept: CARDIOLOGY | Facility: HOSPITAL | Age: 82
End: 2022-11-10

## 2022-11-10 VITALS
WEIGHT: 182.2 LBS | OXYGEN SATURATION: 99 % | DIASTOLIC BLOOD PRESSURE: 63 MMHG | HEART RATE: 84 BPM | RESPIRATION RATE: 16 BRPM | TEMPERATURE: 96.8 F | HEIGHT: 69 IN | BODY MASS INDEX: 26.98 KG/M2 | SYSTOLIC BLOOD PRESSURE: 128 MMHG

## 2022-11-10 DIAGNOSIS — I25.10 CORONARY ARTERY CALCIFICATION: Primary | ICD-10-CM

## 2022-11-10 DIAGNOSIS — R60.0 BILATERAL LOWER EXTREMITY EDEMA: ICD-10-CM

## 2022-11-10 DIAGNOSIS — I25.84 CORONARY ARTERY CALCIFICATION: Primary | ICD-10-CM

## 2022-11-10 DIAGNOSIS — J81.0 ACUTE PULMONARY EDEMA: ICD-10-CM

## 2022-11-10 PROCEDURE — 99215 OFFICE O/P EST HI 40 MIN: CPT | Performed by: NURSE PRACTITIONER

## 2022-11-10 RX ORDER — ATORVASTATIN CALCIUM 20 MG/1
20 TABLET, FILM COATED ORAL DAILY
Qty: 90 TABLET | Refills: 1 | Status: SHIPPED | OUTPATIENT
Start: 2022-11-10

## 2022-11-10 RX ORDER — POTASSIUM CHLORIDE 20 MEQ/1
20 TABLET, EXTENDED RELEASE ORAL AS NEEDED
Qty: 30 TABLET | Refills: 0
Start: 2022-11-10

## 2022-11-10 RX ORDER — FEXOFENADINE HYDROCHLORIDE 60 MG/1
60 TABLET, FILM COATED ORAL DAILY
COMMUNITY

## 2022-11-10 RX ORDER — ASPIRIN 81 MG/1
81 TABLET, CHEWABLE ORAL DAILY
COMMUNITY

## 2022-11-10 RX ORDER — FUROSEMIDE 40 MG/1
40 TABLET ORAL AS NEEDED
Qty: 30 TABLET | Refills: 0
Start: 2022-11-10

## 2022-11-10 NOTE — PROGRESS NOTES
Chief Complaint  Edema and Follow-up    Subjective      History of Present Illness {  Problem List  Visit  Diagnosis   Encounters  Notes  Medications  Labs  Result Review Imaging  Media :23}     Raul Mancuso Charles, 82 y.o. male with BPH, heartburn presents to [facility] for Edema and Follow-up.    Patient recently evaluated at [facility] emergency department for complaints of cough, upper respiratory congestion, and mild shortness of breath.  Emergency department work-up revealed normal troponin/BNP, CXR with cardiomegaly with pulmonary vascular congestion/small pleural effusion, ECG with NSR/no acute abnormality, respiratory panel negative for influenza/COVID-19. Patient was discharged with a short course of furosemide.  Patient completed follow-up at [facility] heart and valve clinic for further evaluation an furosemide was increased and TTE ordered; patient also referred to pulmonary for lung nodule identified on ED CXR.    Since previous evaluation patient completed TTE that revealed preserved LV systolic function, grade I diastolic dysfunction, no significant valvular abnormalities. Patient completed CT ordered by pulmonary that revealed three vessel coronary calcifications, trace pleural effusion.    Patient presents today feeling much improved since previous evaluation. Recently saw PCP earlier this week and initiated on antibiotic therapy for continued URI/sinus congestion symptoms. Reports improvement in shortness of breath, and LE edema is much improved/basically returned to baseline; weight is also back to baseline. Reports he returned to golfing this week. He denies chest/anginal pain, palpitation/tachypalpitation, near syncope/syncope.      Objective     Vital Signs:   Vitals:    11/10/22 1439   BP: 128/63   BP Location: Left arm   Patient Position: Sitting   Cuff Size: Adult   Pulse: 84   Resp: 16   Temp: 96.8 °F (36 °C)   TempSrc: Temporal   SpO2:  "99%   Weight: 82.6 kg (182 lb 3.2 oz)   Height: 175.3 cm (69\")     Body mass index is 26.91 kg/m².  Physical Exam  Vitals and nursing note reviewed.   Constitutional:       Appearance: Normal appearance.   HENT:      Head: Normocephalic.   Eyes:      Extraocular Movements: Extraocular movements intact.   Neck:      Vascular: No carotid bruit.   Cardiovascular:      Rate and Rhythm: Normal rate and regular rhythm.      Pulses: Normal pulses.      Heart sounds: Normal heart sounds, S1 normal and S2 normal. No murmur heard.  Pulmonary:      Effort: Pulmonary effort is normal. No respiratory distress.      Breath sounds: Normal breath sounds.   Musculoskeletal:      Cervical back: Neck supple.      Right lower leg: Edema present.      Left lower leg: Edema present.      Comments: Trace bilateral LE edema   Skin:     General: Skin is warm and dry.   Neurological:      General: No focal deficit present.      Mental Status: He is alert.   Psychiatric:         Mood and Affect: Mood normal.         Behavior: Behavior normal.         Thought Content: Thought content normal.       Data Reviewed:{ Labs  Result Review  Imaging  Med Tab  Media :23}     CT Chest Without Contrast Diagnostic (11/09/2022 15:54)  Duplex Venous Lower Extremity - Bilateral CAR (11/09/2022 15:33)  Adult Transthoracic Echo Complete W/ Cont if Necessary Per Protocol (11/03/2022 15:16)    CBC & Differential (10/23/2022 11:27)  Comprehensive Metabolic Panel (10/23/2022 11:27)  Troponin (10/23/2022 11:27)  BNP (10/23/2022 11:27)  XR Chest 1 View (10/23/2022 11:33)  ECG 12 Lead (10/23/2022 11:43)      Assessment & Plan   Assessment and Plan {CC Problem List  Visit Diagnosis  ROS  Review (Popup)  Health Maintenance  Quality  BestPractice  Medications  SmartSets  SnapShot Encounters  Media :23}     1. Bilateral LE edema/Acute pulmonary edema (HCC)  -Recent emergency department evaluation with URI symptoms; CXR revealed cardiomegaly with pulmonary " vascular congestion.  Initiated on furosemide 20 Mg daily and emergency department evaluation.  No previous cardiac diagnoses.BNP normal at 272 at ED evaluation  -Recent TTE with preserved LV function, no significant valvular abnormality  -CT with trace pleural effusion  -LE duplex negative for DVT  -Reports weight/LE edema back to baseline  -Transition furosemide/potassium to prn given his improved effusion, weight/LE edema back to baseline  -Follow-up in [facility] for worsened symptoms.    2. Coronary calcification  -Patient completed CT ordered by pulmonary that revealed three vessel coronary calcifications, trace pleural effusion.  -Denies chest/anginal symptoms  -Continue ASA 81mg daily  -Initiate atorvastatin 20mg nightly  -Referral to cardiology for surveillance/further evaluation    3. Nodular radiologic density  -Recent evaluation by pulmonary with follow-up CT  -Follow-up as directed/recommended      Follow Up {Instructions Charge Capture  Follow-up Communications :23}     Return if symptoms worsen or fail to improve.    Time Spent: I spent 43 minutes caring for Raul Soto on this date of service. This time includes time spent by me in the following activities: preparing for the visit, reviewing tests, obtaining and/or reviewing a separately obtained history, performing a medically appropriate examination and/or evaluation, counseling and educating the patient/family/caregiver, documenting information in the medical record and independently interpreting results and communicating that information with the patient/family/caregiver. All time noted occurred on the date of service.      Patient was given instructions and counseling regarding his condition or for health maintenance advice. Please see specific information pulled into the AVS if appropriate.  Patient was instructed to call the [facility] with any questions, concerns, or worsening symptoms.    Dictated Utilizing Dragon Dictation    Please note that portions of this note were completed with a voice recognition program.   Part of this note may be an electronic transcription/translation of spoken language to printed text using the Dragon Dictation System.

## 2022-11-18 ENCOUNTER — OFFICE VISIT (OUTPATIENT)
Dept: CARDIOLOGY | Facility: CLINIC | Age: 82
End: 2022-11-18

## 2022-11-18 VITALS
HEIGHT: 69 IN | OXYGEN SATURATION: 98 % | SYSTOLIC BLOOD PRESSURE: 118 MMHG | BODY MASS INDEX: 27.4 KG/M2 | HEART RATE: 86 BPM | DIASTOLIC BLOOD PRESSURE: 62 MMHG | WEIGHT: 185 LBS

## 2022-11-18 DIAGNOSIS — R60.0 BILATERAL LOWER EXTREMITY EDEMA: Primary | ICD-10-CM

## 2022-11-18 DIAGNOSIS — I25.10 CORONARY ARTERY CALCIFICATION: ICD-10-CM

## 2022-11-18 DIAGNOSIS — I25.84 CORONARY ARTERY CALCIFICATION: ICD-10-CM

## 2022-11-18 PROCEDURE — 99213 OFFICE O/P EST LOW 20 MIN: CPT | Performed by: INTERNAL MEDICINE

## 2022-11-18 RX ORDER — CEFUROXIME AXETIL 250 MG/1
TABLET ORAL
COMMUNITY
Start: 2022-11-08

## 2022-11-18 RX ORDER — DOXAZOSIN 8 MG/1
TABLET ORAL
COMMUNITY
Start: 2022-11-09

## 2022-11-18 NOTE — PROGRESS NOTES
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  [facility]    PROBLEM LIST:  1. Acute pulmonary edema/bilateral lower extremity edema  a. Echo 11/3/2022: EF 61 to 65%, mild LV concentric hypertrophy, LV grade 1 diastolic function, RVSP <35  b. 11/9/2022 bilateral lower extremity venous duplex: Normal study, no DVT or superficial thrombophlebitis noted  c. Remote routine Stress test around 2005 normal per patient  2. CAD, incidental finding on CT  a. 11/9/2022 CT chest without: Three-vessel coronary artery calcifications  3. GERD  4. BPH      CC: new patient, ED f/u acute pulmonary edema, LE edema    Allergies  No Known Allergies    Current Medications    Current Outpatient Medications:   •  aspirin 81 MG chewable tablet, Chew 1 tablet Daily., Disp: , Rfl:   •  atorvastatin (LIPITOR) 20 MG tablet, Take 1 tablet by mouth Daily., Disp: 90 tablet, Rfl: 1  •  cefuroxime (CEFTIN) 250 MG tablet, , Disp: , Rfl:   •  doxazosin (CARDURA) 8 MG tablet, , Disp: , Rfl:   •  fexofenadine (ALLEGRA) 60 MG tablet, Take 1 tablet by mouth Daily., Disp: , Rfl:   •  finasteride (PROSCAR) 5 MG tablet, , Disp: , Rfl:   •  furosemide (LASIX) 40 MG tablet, Take 1 tablet by mouth As Needed (for weight gain of 3lb) for up to 30 doses., Disp: 30 tablet, Rfl: 0  •  montelukast (SINGULAIR) 10 MG tablet, Take 1 tablet by mouth Every Night., Disp: , Rfl:   •  omeprazole (priLOSEC) 20 MG capsule, Take 1 capsule by mouth Daily., Disp: , Rfl:   •  potassium chloride (K-DUR,KLOR-CON) 20 MEQ CR tablet, Take 1 tablet by mouth As Needed (with furosemide)., Disp: 30 tablet, Rfl: 0     History of Present Illness   HPI  Raul Soto is a 82 y.o. year old male with the above mentioned PMH who  "presents for consult from ISMAEL Pinto for evaluation of lower extremity edema and ED follow-up for URI symptoms and was found to have acute pulmonary edema with normal BNP, EKG and troponin. He was discharged home on short course of furosemide. TTE revealed normal LV systolic function, grade 1 diastolic dysfunction and no significant valvular abnormalities.  Patient reports he exercises on elliptical 5 times a week and plays golf when he can.  His mother had a heart attack around 65, but made lifestyle modifications and lived to be .  He smoked about a pack per day for 45 years decades ago.  His lower extremity edema has resolved.  He now wears compression stockings and has Lasix to take as needed.  Coronary artery calcifications were noted on CT.    Pt denies any chest pain, dyspnea at rest, dyspnea on exertion, orthopnea, PND, palpitations, lower extremity edema, or claudication. Pt denies history of CHF, DVT, PE, MI, CVA, TIA, or rheumatic fever.       ROS  Review of Systems   HENT: Positive for congestion.    Cardiovascular: Positive for leg swelling.   Respiratory: Positive for cough.    All other systems reviewed and are negative.      SOCIAL HX  Social History     Socioeconomic History   • Marital status:    Tobacco Use   • Smoking status: Former     Packs/day: 1.50     Years: 5.00     Pack years: 7.50     Types: Cigarettes     Quit date: 1970     Years since quittin.9   • Smokeless tobacco: Never   Vaping Use   • Vaping Use: Never used   Substance and Sexual Activity   • Alcohol use: Not Currently   • Drug use: Never   • Sexual activity: Defer       FAMILY HX  History reviewed. No pertinent family history.    Vitals:    22 0853   BP: 118/62   BP Location: Left arm   Patient Position: Sitting   Pulse: 86   SpO2: 98%   Weight: 83.9 kg (185 lb)   Height: 175.3 cm (69.02\")     Body mass index is 27.31 kg/m².     PHYSICAL EXAMINATION:  Constitutional:       Appearance: Healthy " appearance. Cachectic.   Pulmonary:      Effort: Pulmonary effort is normal.      Breath sounds: Normal breath sounds. No wheezing. No rhonchi. No rales.   Chest:      Chest wall: Not tender to palpatation.   Cardiovascular:      PMI at left midclavicular line. Normal rate. Regular rhythm. Normal S1. Normal S2.      Murmurs: There is no murmur.      No gallop. No click. No rub.   Pulses:     Intact distal pulses.   Edema:     Thigh: bilateral pitting edema of the thigh.     Pretibial: bilateral pitting edema of the pretibial area.     Ankle: bilateral pitting edema of the ankle.     Feet: bilateral pitting edema of the feet.     Comments: Prepedal and lower tibial edema with compression socks   Skin:     General: Skin is warm and dry.   Neurological:      General: No focal deficit present.      Mental Status: Alert and oriented to person, place and time.         Diagnostic Data:  Procedures  No results found for: CHLPL, TRIG, HDL, LDLDIRECT  Lab Results   Component Value Date    GLUCOSE 121 (H) 10/23/2022    BUN 13 10/23/2022    CREATININE 1.00 10/23/2022     10/23/2022    K 4.2 10/23/2022     10/23/2022    CO2 27.0 10/23/2022     No results found for: HGBA1C  Lab Results   Component Value Date    WBC 9.84 10/23/2022    HGB 14.4 10/23/2022    HCT 42.7 10/23/2022     10/23/2022       ASSESSMENT:   Diagnosis Plan   1. Bilateral lower extremity edema        2. Coronary artery calcification            PLAN:  Bilateral Lower Extremity Edema-edema has resolved since recovery from URI.  Now wears compression stockings and has Lasix as needed, but he reports he has not needed this very frequently.  Echo unremarkable in October with normal systolic function and mild diastolic dysfunction likely noncontributory to his ED visit for acute pulmonary edema and lower extremity edema in the setting of URI.   I would document urinary albumin if not checked historically    Coronary artery calcification-found  incidentally on CT chest.  Risk factors for CAD were reviewed with patient.   His blood pressure is controlled without medication.  Cholesterol has been normal per patient.  Patient has never had diabetes.  Remote 4-year smoking history decades ago. Incidental calcification coronary arteries not alarming in an 82-year-old currently asymptomatic male with good functional capacity.  Patient had a normal routine stress test 17 years ago.  There is no indication for further ischemic evaluation at this time.         ISMAEL Pinto, thank you for referring Mr. Soto for evaluation.  I have forwarded my electronically generated recommendations to you for review.  Please do not hesitate to call with any questions.    Scribed by Carl Little PA-C for Dr. Keny Black MD, FACC

## 2022-11-29 ENCOUNTER — LAB (OUTPATIENT)
Dept: LAB | Facility: HOSPITAL | Age: 82
End: 2022-11-29

## 2022-11-29 ENCOUNTER — TRANSCRIBE ORDERS (OUTPATIENT)
Dept: LAB | Facility: HOSPITAL | Age: 82
End: 2022-11-29

## 2022-11-29 DIAGNOSIS — Z01.812 PRE-OPERATIVE LABORATORY EXAMINATION: ICD-10-CM

## 2022-11-29 DIAGNOSIS — Z01.812 PRE-OPERATIVE LABORATORY EXAMINATION: Primary | ICD-10-CM

## 2022-11-29 LAB
ANION GAP SERPL CALCULATED.3IONS-SCNC: 7.6 MMOL/L (ref 5–15)
BASOPHILS # BLD AUTO: 0.06 10*3/MM3 (ref 0–0.2)
BASOPHILS NFR BLD AUTO: 0.9 % (ref 0–1.5)
BUN SERPL-MCNC: 16 MG/DL (ref 8–23)
BUN/CREAT SERPL: 17.8 (ref 7–25)
CALCIUM SPEC-SCNC: 9.2 MG/DL (ref 8.6–10.5)
CHLORIDE SERPL-SCNC: 103 MMOL/L (ref 98–107)
CO2 SERPL-SCNC: 27.4 MMOL/L (ref 22–29)
CREAT SERPL-MCNC: 0.9 MG/DL (ref 0.76–1.27)
DEPRECATED RDW RBC AUTO: 40.8 FL (ref 37–54)
EGFRCR SERPLBLD CKD-EPI 2021: 85.3 ML/MIN/1.73
EOSINOPHIL # BLD AUTO: 0.44 10*3/MM3 (ref 0–0.4)
EOSINOPHIL NFR BLD AUTO: 6.6 % (ref 0.3–6.2)
ERYTHROCYTE [DISTWIDTH] IN BLOOD BY AUTOMATED COUNT: 12.2 % (ref 12.3–15.4)
GLUCOSE SERPL-MCNC: 89 MG/DL (ref 65–99)
HCT VFR BLD AUTO: 42.9 % (ref 37.5–51)
HGB BLD-MCNC: 14.3 G/DL (ref 13–17.7)
IMM GRANULOCYTES # BLD AUTO: 0.01 10*3/MM3 (ref 0–0.05)
IMM GRANULOCYTES NFR BLD AUTO: 0.1 % (ref 0–0.5)
LYMPHOCYTES # BLD AUTO: 1.59 10*3/MM3 (ref 0.7–3.1)
LYMPHOCYTES NFR BLD AUTO: 23.7 % (ref 19.6–45.3)
MCH RBC QN AUTO: 30.6 PG (ref 26.6–33)
MCHC RBC AUTO-ENTMCNC: 33.3 G/DL (ref 31.5–35.7)
MCV RBC AUTO: 91.9 FL (ref 79–97)
MONOCYTES # BLD AUTO: 0.6 10*3/MM3 (ref 0.1–0.9)
MONOCYTES NFR BLD AUTO: 8.9 % (ref 5–12)
NEUTROPHILS NFR BLD AUTO: 4.01 10*3/MM3 (ref 1.7–7)
NEUTROPHILS NFR BLD AUTO: 59.8 % (ref 42.7–76)
NRBC BLD AUTO-RTO: 0 /100 WBC (ref 0–0.2)
PLATELET # BLD AUTO: 173 10*3/MM3 (ref 140–450)
PMV BLD AUTO: 10.5 FL (ref 6–12)
POTASSIUM SERPL-SCNC: 4.4 MMOL/L (ref 3.5–5.2)
RBC # BLD AUTO: 4.67 10*6/MM3 (ref 4.14–5.8)
SODIUM SERPL-SCNC: 138 MMOL/L (ref 136–145)
WBC NRBC COR # BLD: 6.71 10*3/MM3 (ref 3.4–10.8)

## 2022-11-29 PROCEDURE — 80048 BASIC METABOLIC PNL TOTAL CA: CPT

## 2022-11-29 PROCEDURE — 85025 COMPLETE CBC W/AUTO DIFF WBC: CPT

## 2022-11-29 PROCEDURE — 36415 COLL VENOUS BLD VENIPUNCTURE: CPT

## 2022-12-01 ENCOUNTER — LAB REQUISITION (OUTPATIENT)
Dept: LAB | Facility: HOSPITAL | Age: 82
End: 2022-12-01
Payer: MEDICARE

## 2022-12-01 DIAGNOSIS — K40.90 UNILATERAL INGUINAL HERNIA, WITHOUT OBSTRUCTION OR GANGRENE, NOT SPECIFIED AS RECURRENT: ICD-10-CM

## 2022-12-01 PROCEDURE — 88304 TISSUE EXAM BY PATHOLOGIST: CPT

## 2022-12-02 LAB — REF LAB TEST METHOD: NORMAL

## 2023-11-16 ENCOUNTER — TELEPHONE (OUTPATIENT)
Dept: CARDIOLOGY | Facility: CLINIC | Age: 83
End: 2023-11-16
Payer: MEDICARE

## 2023-11-16 NOTE — TELEPHONE ENCOUNTER
"Caller: Raul Soto \"Rayray\"    Relationship to patient: Self    Best call back number: [phone]      Type of visit: 1 YR F/U    Requested date: ANYTIME FROM 12-1 THRU 12-13     If rescheduling, when is the original appointment: 12-15-23     Additional notes:NOTHING SHOWS AVAILABLE DURING THIS TIME FRAME. PLEASE CONTACT PATIENT WITH A SUITABLE DATE. HE IS LEAVING ON 12-14-23 AND WILL BE GONE UNTIL .           "

## 2023-12-08 ENCOUNTER — LAB (OUTPATIENT)
Dept: LAB | Facility: HOSPITAL | Age: 83
End: 2023-12-08
Payer: MEDICARE

## 2023-12-08 ENCOUNTER — OFFICE VISIT (OUTPATIENT)
Dept: CARDIOLOGY | Facility: CLINIC | Age: 83
End: 2023-12-08
Payer: MEDICARE

## 2023-12-08 VITALS
DIASTOLIC BLOOD PRESSURE: 80 MMHG | WEIGHT: 192 LBS | OXYGEN SATURATION: 91 % | BODY MASS INDEX: 28.44 KG/M2 | HEART RATE: 78 BPM | SYSTOLIC BLOOD PRESSURE: 146 MMHG | HEIGHT: 69 IN

## 2023-12-08 DIAGNOSIS — I25.10 MILD CAD: Primary | ICD-10-CM

## 2023-12-08 DIAGNOSIS — I25.10 CORONARY ARTERY DISEASE INVOLVING NATIVE CORONARY ARTERY OF NATIVE HEART WITHOUT ANGINA PECTORIS: ICD-10-CM

## 2023-12-08 DIAGNOSIS — E78.2 MIXED HYPERLIPIDEMIA: ICD-10-CM

## 2023-12-08 DIAGNOSIS — I25.10 MILD CAD: ICD-10-CM

## 2023-12-08 PROCEDURE — 99213 OFFICE O/P EST LOW 20 MIN: CPT | Performed by: INTERNAL MEDICINE

## 2023-12-08 RX ORDER — TRIAMCINOLONE ACETONIDE 1 MG/G
1 CREAM TOPICAL 2 TIMES DAILY
COMMUNITY
Start: 2023-09-13

## 2023-12-08 NOTE — PROGRESS NOTES
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  [facility]     PROBLEM LIST:  Acute pulmonary edema/bilateral lower extremity edema  Echo 11/3/2022: EF 61 to 65%, mild LV concentric hypertrophy, LV grade 1 diastolic function, RVSP <35  11/9/2022 bilateral lower extremity venous duplex: Normal study, no DVT or superficial thrombophlebitis noted  Remote routine Stress test around 2005 normal per patient  CAD, incidental finding on CT  11/9/2022 CT chest without: Three-vessel coronary artery calcifications  GERD  BPH          CC:   Chief Complaint   Patient presents with    Coronary Artery Disease     Bilateral lower extremity edema         Allergies  No Known Allergies    Current Medications  Current Outpatient Medications   Medication Instructions    aspirin 81 mg, Oral, Daily    finasteride (PROSCAR) 5 MG tablet No dose, route, or frequency recorded.    omeprazole (PRILOSEC) 20 mg, Oral, Daily    triamcinolone (KENALOG) 0.1 % cream 1 application , Topical, 2 Times Daily        History of Present Illness   Raul Soto is a 83 y.o. year old male here for follow up.  States he feels well he walks on his elliptical 7 days a week.  He notes no provokable symptoms.  He has a blood pressure monitor at home but does not check his blood pressure.  He will spend the next 4 months in [facility]      OBJECTIVE:  Vitals:    12/08/23 1350   BP: 146/80   BP Location: Left arm   Patient Position: Sitting   Pulse: 78   SpO2: 91%   Weight: 87.1 kg (192 lb)   Height: 175.3 cm (69\")     Body mass index is 28.35 kg/m².    Constitutional:       Appearance: Healthy appearance. Not in distress.   Neck:      Vascular: No JVR. JVD normal.   Pulmonary:      Effort: " Pulmonary effort is normal.      Breath sounds: Normal breath sounds. No wheezing. No rhonchi. No rales.   Chest:      Chest wall: Not tender to palpatation.   Cardiovascular:      PMI at left midclavicular line. Normal rate. Regular rhythm. Normal S1. Normal S2.       Murmurs: There is no murmur.      No gallop.  No click. No rub.   Pulses:     Intact distal pulses.   Edema:     Peripheral edema absent.   Abdominal:      General: Bowel sounds are normal.      Palpations: Abdomen is soft.      Tenderness: There is no abdominal tenderness.   Musculoskeletal: Normal range of motion.         General: No tenderness. Skin:     General: Skin is warm and dry.   Neurological:      General: No focal deficit present.      Mental Status: Alert and oriented to person, place and time.         Diagnostic Data:  Procedures        ASSESSMENT:   Diagnosis Plan   1. Mild CAD  Lipid Panel    Comprehensive Metabolic Panel      2. Coronary artery disease involving native coronary artery of native heart without angina pectoris        3. Mixed hyperlipidemia            PLAN:  CAD as manifested by coronary calcification would document lipid profile and determine if lipid modification would be resuggested.  Historically he had taken atorvastatin but philosophically does not wish to take unless absolutely necessary          Keny Black MD, [facility]

## 2023-12-09 LAB
ALBUMIN SERPL-MCNC: 4.1 G/DL (ref 3.5–5.2)
ALBUMIN/GLOB SERPL: 1.9 G/DL
ALP SERPL-CCNC: 62 U/L (ref 39–117)
ALT SERPL-CCNC: 14 U/L (ref 1–41)
AST SERPL-CCNC: 18 U/L (ref 1–40)
BILIRUB SERPL-MCNC: 0.4 MG/DL (ref 0–1.2)
BUN SERPL-MCNC: 14 MG/DL (ref 8–23)
BUN/CREAT SERPL: 14.3 (ref 7–25)
CALCIUM SERPL-MCNC: 9.6 MG/DL (ref 8.6–10.5)
CHLORIDE SERPL-SCNC: 103 MMOL/L (ref 98–107)
CHOLEST SERPL-MCNC: 183 MG/DL (ref 0–200)
CO2 SERPL-SCNC: 28.6 MMOL/L (ref 22–29)
CREAT SERPL-MCNC: 0.98 MG/DL (ref 0.76–1.27)
EGFRCR SERPLBLD CKD-EPI 2021: 76.5 ML/MIN/1.73
GLOBULIN SER CALC-MCNC: 2.2 GM/DL
GLUCOSE SERPL-MCNC: 96 MG/DL (ref 65–99)
HDLC SERPL-MCNC: 65 MG/DL (ref 40–60)
LDLC SERPL CALC-MCNC: 108 MG/DL (ref 0–100)
POTASSIUM SERPL-SCNC: 4.5 MMOL/L (ref 3.5–5.2)
PROT SERPL-MCNC: 6.3 G/DL (ref 6–8.5)
SODIUM SERPL-SCNC: 140 MMOL/L (ref 136–145)
TRIGL SERPL-MCNC: 50 MG/DL (ref 0–150)
VLDLC SERPL CALC-MCNC: 10 MG/DL (ref 5–40)

## 2023-12-11 ENCOUNTER — TELEPHONE (OUTPATIENT)
Dept: CARDIOLOGY | Facility: CLINIC | Age: 83
End: 2023-12-11
Payer: MEDICARE

## 2023-12-11 NOTE — TELEPHONE ENCOUNTER
"Attempted to call patient to rely recommendations.  No answer, left voicemail to call back.     Patient called back and I relayed recommendations.    Patient states he \"will not be taking a statin at this point\", he will just try to manage it on his own and try to lose to weight.     Patient to call back if he has any questions or concerns.   "

## 2023-12-11 NOTE — TELEPHONE ENCOUNTER
----- Message from Keny Black MD sent at 12/11/2023  1:15 PM EST -----  LDL > 100  Want <70 with coronary calcifications  Rec statin  If agrees crestor 20  ----- Message -----  From: Interface, Reflab Results In  Sent: 12/9/2023   4:08 AM EST  To: Keny Black MD

## 2024-07-09 ENCOUNTER — TELEPHONE (OUTPATIENT)
Dept: CARDIOLOGY | Facility: CLINIC | Age: 84
End: 2024-07-09
Payer: MEDICARE

## 2024-07-10 ENCOUNTER — TELEPHONE (OUTPATIENT)
Dept: CARDIOLOGY | Facility: CLINIC | Age: 84
End: 2024-07-10
Payer: MEDICARE

## 2024-07-11 ENCOUNTER — TELEPHONE (OUTPATIENT)
Dept: CARDIOLOGY | Facility: CLINIC | Age: 84
End: 2024-07-11
Payer: MEDICARE

## 2024-07-11 NOTE — TELEPHONE ENCOUNTER
Followed up with pt , able to still use his elliptical 5 days a week , would like to see [facility] before going to [facility] in November .     Pt encouraged to Avoid being out in the heat, reduce salt, wear stockings, elevate LE, avoid use of NSAIDs, see PCP.  Pt verbalized understanding , no further questions at this time .

## 2024-07-11 NOTE — TELEPHONE ENCOUNTER
Edema has been evaluated in the past, no hx of heart failure. Still doing elliptical/exercise regimen without limiting symptoms?    Avoid being out in the heat, reduce salt, wear stockings, elevate LE, avoid use of NSAIDs, see PCP. PCP can refer back if needed.   Doubt we can get him in with [facility] anytime soon. I can see him if agreeable at [place].     He is likely on wait list to get annual [facility] appt moved up to December.    I agree with plan, management and procedure performed by PA.

## 2024-07-11 NOTE — TELEPHONE ENCOUNTER
Pt states he's gained approximately 8 lbs over the past 3-4 weeks. He's been to his PCP for reflux concerns , and urologist recently and noticed the weight gain from those appointments. May be slighly soa, but very minimal if at all per pt . Vitals remain wnl, per recent appointments . Does wear compression socks reducing the edema , pt does endorse taking OTC allergy medicine.  Reviewed med list . Thoughts? Pt also would like a sooner follow up appt .

## 2024-08-23 ENCOUNTER — LAB (OUTPATIENT)
Dept: LAB | Facility: HOSPITAL | Age: 84
End: 2024-08-23
Payer: MEDICARE

## 2024-08-23 ENCOUNTER — OFFICE VISIT (OUTPATIENT)
Dept: CARDIOLOGY | Facility: CLINIC | Age: 84
End: 2024-08-23
Payer: MEDICARE

## 2024-08-23 VITALS
DIASTOLIC BLOOD PRESSURE: 68 MMHG | SYSTOLIC BLOOD PRESSURE: 136 MMHG | HEART RATE: 78 BPM | BODY MASS INDEX: 28.73 KG/M2 | OXYGEN SATURATION: 98 % | HEIGHT: 69 IN | WEIGHT: 194 LBS

## 2024-08-23 DIAGNOSIS — I50.42 CHRONIC COMBINED SYSTOLIC (CONGESTIVE) AND DIASTOLIC (CONGESTIVE) HEART FAILURE: ICD-10-CM

## 2024-08-23 DIAGNOSIS — I25.10 CORONARY ARTERY DISEASE INVOLVING NATIVE CORONARY ARTERY OF NATIVE HEART WITHOUT ANGINA PECTORIS: ICD-10-CM

## 2024-08-23 DIAGNOSIS — I10 PRIMARY HYPERTENSION: ICD-10-CM

## 2024-08-23 DIAGNOSIS — I25.10 CAD, MULTIPLE VESSEL: ICD-10-CM

## 2024-08-23 DIAGNOSIS — E78.2 MIXED HYPERLIPIDEMIA: ICD-10-CM

## 2024-08-23 DIAGNOSIS — I25.10 CAD, MULTIPLE VESSEL: Primary | ICD-10-CM

## 2024-08-23 LAB
ALBUMIN SERPL-MCNC: 3.9 G/DL (ref 3.5–5.2)
ALBUMIN/GLOB SERPL: 1.6 G/DL
ALP SERPL-CCNC: 59 U/L (ref 39–117)
ALT SERPL W P-5'-P-CCNC: 14 U/L (ref 1–41)
ANION GAP SERPL CALCULATED.3IONS-SCNC: 5 MMOL/L (ref 5–15)
AST SERPL-CCNC: 18 U/L (ref 1–40)
BILIRUB SERPL-MCNC: 0.4 MG/DL (ref 0–1.2)
BUN SERPL-MCNC: 12 MG/DL (ref 8–23)
BUN/CREAT SERPL: 11.8 (ref 7–25)
CALCIUM SPEC-SCNC: 9.5 MG/DL (ref 8.6–10.5)
CHLORIDE SERPL-SCNC: 105 MMOL/L (ref 98–107)
CHOLEST SERPL-MCNC: 165 MG/DL (ref 0–200)
CO2 SERPL-SCNC: 29 MMOL/L (ref 22–29)
CREAT SERPL-MCNC: 1.02 MG/DL (ref 0.76–1.27)
DEPRECATED RDW RBC AUTO: 38.7 FL (ref 37–54)
EGFRCR SERPLBLD CKD-EPI 2021: 72.9 ML/MIN/1.73
ERYTHROCYTE [DISTWIDTH] IN BLOOD BY AUTOMATED COUNT: 11.7 % (ref 12.3–15.4)
GLOBULIN UR ELPH-MCNC: 2.4 GM/DL
GLUCOSE SERPL-MCNC: 95 MG/DL (ref 65–99)
HCT VFR BLD AUTO: 43.3 % (ref 37.5–51)
HDLC SERPL-MCNC: 63 MG/DL (ref 40–60)
HGB BLD-MCNC: 14.5 G/DL (ref 13–17.7)
LDLC SERPL CALC-MCNC: 95 MG/DL (ref 0–100)
LDLC/HDLC SERPL: 1.52 {RATIO}
MCH RBC QN AUTO: 30.9 PG (ref 26.6–33)
MCHC RBC AUTO-ENTMCNC: 33.5 G/DL (ref 31.5–35.7)
MCV RBC AUTO: 92.1 FL (ref 79–97)
NT-PROBNP SERPL-MCNC: 61.9 PG/ML (ref 0–1800)
PLATELET # BLD AUTO: 172 10*3/MM3 (ref 140–450)
PMV BLD AUTO: 11 FL (ref 6–12)
POTASSIUM SERPL-SCNC: 4.7 MMOL/L (ref 3.5–5.2)
PROT SERPL-MCNC: 6.3 G/DL (ref 6–8.5)
RBC # BLD AUTO: 4.7 10*6/MM3 (ref 4.14–5.8)
SODIUM SERPL-SCNC: 139 MMOL/L (ref 136–145)
TRIGL SERPL-MCNC: 32 MG/DL (ref 0–150)
VLDLC SERPL-MCNC: 7 MG/DL (ref 5–40)
WBC NRBC COR # BLD AUTO: 6.39 10*3/MM3 (ref 3.4–10.8)

## 2024-08-23 PROCEDURE — 80061 LIPID PANEL: CPT

## 2024-08-23 PROCEDURE — 83880 ASSAY OF NATRIURETIC PEPTIDE: CPT

## 2024-08-23 PROCEDURE — 85027 COMPLETE CBC AUTOMATED: CPT

## 2024-08-23 PROCEDURE — 80053 COMPREHEN METABOLIC PANEL: CPT

## 2024-08-23 PROCEDURE — 36415 COLL VENOUS BLD VENIPUNCTURE: CPT

## 2024-08-23 RX ORDER — DOXAZOSIN 8 MG/1
8 TABLET ORAL DAILY
COMMUNITY
Start: 2024-07-08

## 2024-08-23 NOTE — PROGRESS NOTES
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  [facility]     PROBLEM LIST:  Acute pulmonary edema/bilateral lower extremity edema  Echo 11/3/2022: EF 61 to 65%, mild LV concentric hypertrophy, LV grade 1 diastolic function, RVSP <35  11/9/2022 bilateral lower extremity venous duplex: Normal study, no DVT or superficial thrombophlebitis noted  Remote routine Stress test around 2005 normal per patient  CAD, incidental finding on CT  11/9/2022 CT chest without: Three-vessel coronary artery calcifications  HL-defers statin   12/23 183/50/65/108  GERD  BPH      CC:   Chief Complaint   Patient presents with    Mild CAD       Allergies  No Known Allergies    Current Medications  Current Outpatient Medications   Medication Instructions    aspirin 81 mg, Oral, Daily    doxazosin (CARDURA) 8 mg, Oral, Daily    finasteride (PROSCAR) 5 MG tablet No dose, route, or frequency recorded.    omeprazole (PRILOSEC) 20 mg, Oral, Daily        History of Present Illness   Raul Soto is a 83 y.o. year old male here for early follow up due to swelling and weight gain.  States he continues to golf and exercise on a regular basis.  He is planning to winter in [place] again.        OBJECTIVE:  Vitals:    08/23/24 1310   BP: 136/68   BP Location: Left arm   Patient Position: Sitting   Cuff Size: Adult   Pulse: 78   SpO2: 98%   Weight: 88 kg (194 lb)   Height: 175.3 cm (69.02\")       Body mass index is 28.64 kg/m².    Constitutional:       Appearance: Healthy appearance. Not in distress.   Neck:      Vascular: No JVR. JVD normal.   Pulmonary:      Effort: Pulmonary effort is normal.      Breath sounds: Normal breath sounds. No wheezing. No rhonchi. No rales.   Chest:      " Chest wall: Not tender to palpatation.   Cardiovascular:      PMI at left midclavicular line. Normal rate. Regular rhythm. Normal S1. Normal S2.       Murmurs: There is no murmur.      No gallop.  No click. No rub.   Pulses:     Intact distal pulses.   Edema:     Peripheral edema present.     Comments: Knee-high compression stockings in place  Abdominal:      General: Bowel sounds are normal.      Palpations: Abdomen is soft.      Tenderness: There is no abdominal tenderness.   Musculoskeletal: Normal range of motion.         General: No tenderness. Skin:     General: Skin is warm and dry.   Neurological:      General: No focal deficit present.      Mental Status: Alert and oriented to person, place and time.         Diagnostic Data:  Procedures        ASSESSMENT:   Diagnosis Plan   1. CAD, multiple vessel  Lipid Panel    Comprehensive Metabolic Panel    CBC (No Diff)    BNP      2. Chronic combined systolic (congestive) and diastolic (congestive) heart failure  BNP      3. Coronary artery disease involving native coronary artery of native heart without angina pectoris        4. Primary hypertension        5. Mixed hyperlipidemia              PLAN:  CAD as manifested by coronary calcification. . Declines statin.    Will document CBC CMP CBC BNP at current    Keep May 2025 appointment for follow up.    Keny Black MD, FACC

## 2024-09-17 NOTE — PROGRESS NOTES
Contacted [facility]'s Community Relations Supervisor Patrick Lu to inquire about billing/coverage related to HD transport.  Will await return call to discuss.  Pt has been denied admission to all HD units, will need to remain at [facility] but no NH facility within 30 mile radius with acceptance.    Rachel Davila RN [facility]  Supervisor Case Management-Miri  [phone]    Your echo showed your heart is squeezing normally and there are no significant valvular abnormalities.  Please keep your upcoming follow-up with ISMAEL Briceño

## 2025-05-30 ENCOUNTER — OFFICE VISIT (OUTPATIENT)
Dept: CARDIOLOGY | Facility: CLINIC | Age: 85
End: 2025-05-30
Payer: MEDICARE

## 2025-05-30 VITALS
HEART RATE: 77 BPM | HEIGHT: 69 IN | SYSTOLIC BLOOD PRESSURE: 124 MMHG | OXYGEN SATURATION: 96 % | WEIGHT: 193.4 LBS | DIASTOLIC BLOOD PRESSURE: 70 MMHG | BODY MASS INDEX: 28.64 KG/M2

## 2025-05-30 DIAGNOSIS — I25.10 CAD, MULTIPLE VESSEL: Primary | ICD-10-CM

## 2025-05-30 DIAGNOSIS — I10 PRIMARY HYPERTENSION: ICD-10-CM

## 2025-05-30 DIAGNOSIS — E78.2 MIXED HYPERLIPIDEMIA: ICD-10-CM

## 2025-05-30 PROCEDURE — 99214 OFFICE O/P EST MOD 30 MIN: CPT | Performed by: INTERNAL MEDICINE

## 2025-05-30 NOTE — PROGRESS NOTES
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  [facility]     PROBLEM LIST:  Acute pulmonary edema/bilateral lower extremity edema  Echo 11/3/2022: EF 61 to 65%, mild LV concentric hypertrophy, LV grade 1 diastolic function, RVSP <35  11/9/2022 bilateral lower extremity venous duplex: Normal study, no DVT or superficial thrombophlebitis noted  Remote routine Stress test around 2005 normal per patient  CAD, incidental finding on CT  11/9/2022 CT chest without: Three-vessel coronary artery calcifications  HL-defers statin  8/24  [id number]   GERD  BPH      CC:   Chief Complaint   Patient presents with    CAD, multiple vessel       Allergies  No Known Allergies    Current Medications  Current Outpatient Medications   Medication Instructions    aspirin 81 mg, Daily    doxazosin (CARDURA) 8 mg, Daily    finasteride (PROSCAR) 5 MG tablet No dose, route, or frequency recorded.    omeprazole (PRILOSEC) 20 mg, Daily        History of Present Illness   Raul Soto is a 84 y.o. year old male here for fu.    Occasional chest wall pain.  He continues to play golf without issue.  He is compliant with medications.  He continues to winter in [place]  Wife is having some evaluations regarding pancreatic lesion    OBJECTIVE:  Vitals:    05/30/25 0933   BP: 124/70   BP Location: Left arm   Patient Position: Sitting   Cuff Size: Adult   Pulse: 77   SpO2: 96%   Weight: 87.7 kg (193 lb 6.4 oz)   Height: 175.3 cm (69.02\")         Body mass index is 28.55 kg/m².    Constitutional:       Appearance: Healthy appearance. Not in distress.   Neck:      Vascular: No JVR. JVD normal.   Pulmonary:      Effort: Pulmonary effort is normal.      Breath sounds: Normal breath " sounds. No wheezing. No rhonchi. No rales.   Chest:      Chest wall: Not tender to palpatation.   Cardiovascular:      PMI at left midclavicular line. Normal rate. Regular rhythm. Normal S1. Normal S2.       Murmurs: There is no murmur.      No gallop.  No click. No rub.   Pulses:     Intact distal pulses.   Edema:     Peripheral edema present.     Comments: Knee-high compression stockings in place  Abdominal:      General: Bowel sounds are normal.      Palpations: Abdomen is soft.      Tenderness: There is no abdominal tenderness.   Musculoskeletal: Normal range of motion.         General: No tenderness. Skin:     General: Skin is warm and dry.   Neurological:      General: No focal deficit present.      Mental Status: Alert and oriented to person, place and time.         Diagnostic Data:  Procedures        ASSESSMENT:   Diagnosis Plan   1. CAD, multiple vessel        2. Mixed hyperlipidemia        3. Primary hypertension                PLAN:  CAD as manifested by coronary calcification. LDL <100.    Mixed dyslipidemia patient defers statin therapy    \Hypertension patient controlled on doxazosin          Keny Black MD, FACC